# Patient Record
Sex: FEMALE | Race: WHITE | NOT HISPANIC OR LATINO | URBAN - METROPOLITAN AREA
[De-identification: names, ages, dates, MRNs, and addresses within clinical notes are randomized per-mention and may not be internally consistent; named-entity substitution may affect disease eponyms.]

---

## 2018-06-20 NOTE — PATIENT DISCUSSION
- Recommend contact lens holiday for 1-2 weeks (OK to wear a new CTL in the left eye instead if desired)

## 2018-06-20 NOTE — PATIENT DISCUSSION
New Prescription: fluorometholone (fluorometholone): drops,suspension: 0.1% 1 drop twice a day as directed into right eye 06-

## 2020-01-14 NOTE — PATIENT DISCUSSION
New Prescription: Maxitrol (neomycin-polymyxin-dexameth): drops,suspension: 3.5-10,000-0.1 mg/mL-unit/mL-% 1 drop three times a day as directed into right eye 01-

## 2020-01-14 NOTE — PATIENT DISCUSSION
- Follow up in 2-3 months for [facility], or sooner PRN if symptoms don't resolve

## 2020-03-09 NOTE — PATIENT DISCUSSION
DUE TO PATIENT PRESENTING WITH POSSIBLE HZV SYMPTOMS DR. Meghana Barnett IS UNABLE TO BE EXPOSED DUE TO PREGNANCY. NO OTHER PROVIDER AVAILABLE IN [facility] TO SEE PATIENT. OFFERED PATIENT TO SEE [facility] IN [place] AS ORIGINALLY SCHEDULED AND SHE DECLINED. DISCUSSED IMPORTANCE OF SEEKING CARE TODAY DUE TO POSSIBLE HZV. PT. UNDERSTANDS AND STATES SHE WILL REPORT TO EMERGENCY ROOM.   Susan Heller

## 2020-03-24 NOTE — PATIENT DISCUSSION
- Has moderate dry eye today with irregular epithelium that could represent a recent abrasion/erosion.

## 2020-03-24 NOTE — PATIENT DISCUSSION
- Had some concern for shingles two weeks ago. Went to [facility] ED and was referred to [facility]- [address] [place] [address] ED, where she saw ?two ophthalmology residents. Unclear diagnosis per patient. Symptoms have improved since then, but still uncomfortable OS.

## 2021-02-19 NOTE — PATIENT DISCUSSION
- Updated glasses and monovision CTL rx given today.  Patient is happy with uncorrected distance vision OS (20/70), but advised that she must wear glasses while driving

## 2021-02-19 NOTE — PATIENT DISCUSSION
- Follow up in 1 year for [facility], [facility]

## 2021-10-25 ENCOUNTER — NEW PATIENT PROBLEM FOCUSED (OUTPATIENT)
Dept: URBAN - METROPOLITAN AREA CLINIC 52 | Facility: CLINIC | Age: 76
End: 2021-10-25

## 2021-10-25 DIAGNOSIS — H33.322: ICD-10-CM

## 2021-10-25 PROCEDURE — 92134 CPTRZ OPH DX IMG PST SGM RTA: CPT

## 2021-10-25 PROCEDURE — 92004 COMPRE OPH EXAM NEW PT 1/>: CPT

## 2021-10-25 ASSESSMENT — VISUAL ACUITY
OS_PH: 20/30
OU_SC: J7 @ 14IN
OD_SC: 20/25-2
OU_SC: 20/25-1
OS_SC: 20/50-1

## 2021-10-25 ASSESSMENT — TONOMETRY
OD_IOP_MMHG: 14
OS_IOP_MMHG: 14

## 2021-10-25 NOTE — PATIENT DISCUSSION
Discussed with patient,  recommend patient be seen asap by Sybil Dominguez in the [place] office with Dr Mary Novak. Patient instructed to go now.

## 2023-08-14 ENCOUNTER — COMPREHENSIVE EXAM (OUTPATIENT)
Dept: URBAN - METROPOLITAN AREA CLINIC 53 | Facility: CLINIC | Age: 78
End: 2023-08-14

## 2023-08-14 DIAGNOSIS — H25.813: ICD-10-CM

## 2023-08-14 DIAGNOSIS — H35.372: ICD-10-CM

## 2023-08-14 DIAGNOSIS — H52.12: ICD-10-CM

## 2023-08-14 PROCEDURE — 92134 CPTRZ OPH DX IMG PST SGM RTA: CPT

## 2023-08-14 PROCEDURE — 92014 COMPRE OPH EXAM EST PT 1/>: CPT

## 2023-08-14 PROCEDURE — 92015 DETERMINE REFRACTIVE STATE: CPT

## 2023-08-14 ASSESSMENT — TONOMETRY
OD_IOP_MMHG: 20
OS_IOP_MMHG: 22
OD_IOP_MMHG: 21
OS_IOP_MMHG: 23

## 2023-08-14 ASSESSMENT — VISUAL ACUITY
OD_GLARE: 20/20
OS_PH: 20/30-2
OD_GLARE: 20/20
OS_GLARE: 20/50
OS_GLARE: 20/50
OD_SC: 20/25
OU_CC: J1+@16"
OS_SC: 20/40-1

## 2023-08-16 ENCOUNTER — EMERGENCY VISIT (OUTPATIENT)
Dept: URBAN - METROPOLITAN AREA CLINIC 53 | Facility: CLINIC | Age: 78
End: 2023-08-16

## 2023-08-16 DIAGNOSIS — H00.024: ICD-10-CM

## 2023-08-16 PROCEDURE — 92012 INTRM OPH EXAM EST PATIENT: CPT

## 2023-08-16 RX ORDER — ERYTHROMYCIN 5 MG/G: OINTMENT OPHTHALMIC

## 2023-08-16 RX ORDER — DOXYCYCLINE HYCLATE 100 MG/1: 1 TABLET ORAL TWICE A DAY

## 2023-08-16 ASSESSMENT — VISUAL ACUITY
OD_SC: 20/25
OS_SC: 20/40

## 2023-08-29 ENCOUNTER — FOLLOW UP (OUTPATIENT)
Dept: URBAN - METROPOLITAN AREA CLINIC 53 | Facility: CLINIC | Age: 78
End: 2023-08-29

## 2023-08-29 DIAGNOSIS — H00.14: ICD-10-CM

## 2023-08-29 PROCEDURE — 92012 INTRM OPH EXAM EST PATIENT: CPT

## 2023-08-29 ASSESSMENT — VISUAL ACUITY
OU_SC: 20/25-1
OD_SC: 20/25-1
OS_SC: 20/60-2
OS_PH: 20/50

## 2023-08-29 ASSESSMENT — TONOMETRY
OD_IOP_MMHG: 20
OS_IOP_MMHG: 20

## 2023-09-11 ENCOUNTER — PRE-OP/H&P (OUTPATIENT)
Dept: URBAN - METROPOLITAN AREA CLINIC 53 | Facility: CLINIC | Age: 78
End: 2023-09-11

## 2023-09-11 DIAGNOSIS — H25.813: ICD-10-CM

## 2023-09-11 PROCEDURE — 92025CV CORNEAL TOPOGRAPHY, CV

## 2023-09-11 PROCEDURE — PREOP PRE OP VISIT

## 2023-09-11 PROCEDURE — 92136 OPHTHALMIC BIOMETRY: CPT

## 2023-09-11 ASSESSMENT — KERATOMETRY
OD_AXISANGLE_DEGREES: 092
OD_K1POWER_DIOPTERS: 44.25
OS_K1POWER_DIOPTERS: 44.25
OS_AXISANGLE2_DEGREES: 178
OD_K2POWER_DIOPTERS: 43.37
OS_AXISANGLE_DEGREES: 088
OD_AXISANGLE2_DEGREES: 2
OS_K2POWER_DIOPTERS: 43.12

## 2023-09-11 ASSESSMENT — VISUAL ACUITY
OS_PH: 20/60
OD_SC: 20/25+2
OS_SC: 20/70

## 2023-09-11 ASSESSMENT — TONOMETRY
OS_IOP_MMHG: 20
OD_IOP_MMHG: 20

## 2023-09-26 ASSESSMENT — KERATOMETRY
OD_K2POWER_DIOPTERS: 43.37
OD_AXISANGLE_DEGREES: 092
OD_K1POWER_DIOPTERS: 44.25
OS_AXISANGLE_DEGREES: 088
OS_K1POWER_DIOPTERS: 44.25
OS_AXISANGLE2_DEGREES: 178
OS_K2POWER_DIOPTERS: 43.12
OD_AXISANGLE2_DEGREES: 2

## 2023-09-27 ENCOUNTER — SURGERY/PROCEDURE (OUTPATIENT)
Dept: URBAN - METROPOLITAN AREA SURGERY 16 | Facility: SURGERY | Age: 78
End: 2023-09-27

## 2023-09-27 ENCOUNTER — POST-OP (OUTPATIENT)
Dept: URBAN - METROPOLITAN AREA CLINIC 53 | Facility: CLINIC | Age: 78
End: 2023-09-27

## 2023-09-27 DIAGNOSIS — Z98.42: ICD-10-CM

## 2023-09-27 DIAGNOSIS — Z96.1: ICD-10-CM

## 2023-09-27 DIAGNOSIS — H25.812: ICD-10-CM

## 2023-09-27 PROCEDURE — 66984CV REMOVE CATARACT, INSERT LENS, CUSTOM VISION

## 2023-09-27 PROCEDURE — 99199PCV CUSTOM VISION

## 2023-09-27 ASSESSMENT — KERATOMETRY
OS_AXISANGLE_DEGREES: 088
OD_K1POWER_DIOPTERS: 44.25
OS_AXISANGLE2_DEGREES: 178
OS_K2POWER_DIOPTERS: 43.12
OD_K2POWER_DIOPTERS: 43.37
OD_AXISANGLE2_DEGREES: 2
OS_K1POWER_DIOPTERS: 44.25
OD_AXISANGLE_DEGREES: 092

## 2023-09-27 ASSESSMENT — VISUAL ACUITY: OS_SC: 20/80

## 2023-09-27 ASSESSMENT — TONOMETRY: OS_IOP_MMHG: 25

## 2023-10-02 ENCOUNTER — POST-OP (OUTPATIENT)
Dept: URBAN - METROPOLITAN AREA CLINIC 53 | Facility: CLINIC | Age: 78
End: 2023-10-02

## 2023-10-02 DIAGNOSIS — Z96.1: ICD-10-CM

## 2023-10-02 DIAGNOSIS — Z98.42: ICD-10-CM

## 2023-10-02 PROCEDURE — 99024 POSTOP FOLLOW-UP VISIT: CPT

## 2023-10-02 RX ORDER — BIMATOPROST 0.1 MG/ML: 1 SOLUTION/ DROPS OPHTHALMIC EVERY EVENING

## 2023-10-02 ASSESSMENT — VISUAL ACUITY
OS_SC: 20/60
OD_SC: 20/20-1

## 2023-10-02 ASSESSMENT — KERATOMETRY
OD_AXISANGLE2_DEGREES: 2
OD_AXISANGLE_DEGREES: 092
OS_K2POWER_DIOPTERS: 43.12
OD_K2POWER_DIOPTERS: 43.37
OS_AXISANGLE_DEGREES: 088
OS_K1POWER_DIOPTERS: 44.25
OS_AXISANGLE2_DEGREES: 178
OD_K1POWER_DIOPTERS: 44.25

## 2023-10-02 ASSESSMENT — TONOMETRY
OS_IOP_MMHG: 39
OD_IOP_MMHG: 17

## 2023-10-23 ENCOUNTER — POST-OP (OUTPATIENT)
Dept: URBAN - METROPOLITAN AREA CLINIC 53 | Facility: CLINIC | Age: 78
End: 2023-10-23

## 2023-10-23 DIAGNOSIS — H35.372: ICD-10-CM

## 2023-10-23 DIAGNOSIS — Z96.1: ICD-10-CM

## 2023-10-23 DIAGNOSIS — Z98.42: ICD-10-CM

## 2023-10-23 DIAGNOSIS — H26.492: ICD-10-CM

## 2023-10-23 PROCEDURE — 92134 CPTRZ OPH DX IMG PST SGM RTA: CPT

## 2023-10-23 PROCEDURE — 99024 POSTOP FOLLOW-UP VISIT: CPT

## 2023-10-23 RX ORDER — BRINZOLAMIDE/BRIMONIDINE TARTRATE 10; 2 MG/ML; MG/ML: 1 SUSPENSION/ DROPS OPHTHALMIC TWICE A DAY

## 2023-10-23 RX ORDER — PREDNISOLONE ACETATE 10 MG/ML: 1 SUSPENSION/ DROPS OPHTHALMIC TWICE A DAY

## 2023-10-23 ASSESSMENT — TONOMETRY
OS_IOP_MMHG: 25
OD_IOP_MMHG: 18

## 2023-10-23 ASSESSMENT — VISUAL ACUITY
OD_SC: 20/25-2
OS_SC: 20/80

## 2023-10-23 ASSESSMENT — KERATOMETRY
OS_AXISANGLE_DEGREES: 088
OS_K2POWER_DIOPTERS: 43.12
OD_K1POWER_DIOPTERS: 44.25
OD_AXISANGLE2_DEGREES: 2
OD_AXISANGLE_DEGREES: 092
OD_K2POWER_DIOPTERS: 43.37
OS_AXISANGLE2_DEGREES: 178
OS_K1POWER_DIOPTERS: 44.25

## 2023-11-27 ENCOUNTER — FOLLOW UP (OUTPATIENT)
Dept: URBAN - METROPOLITAN AREA CLINIC 53 | Facility: CLINIC | Age: 78
End: 2023-11-27

## 2023-11-27 DIAGNOSIS — H35.372: ICD-10-CM

## 2023-11-27 DIAGNOSIS — Z96.1: ICD-10-CM

## 2023-11-27 PROCEDURE — 92134 CPTRZ OPH DX IMG PST SGM RTA: CPT

## 2023-11-27 PROCEDURE — 92012 INTRM OPH EXAM EST PATIENT: CPT

## 2023-11-27 ASSESSMENT — KERATOMETRY
OS_AXISANGLE_DEGREES: 088
OD_K2POWER_DIOPTERS: 43.37
OD_K1POWER_DIOPTERS: 44.25
OS_K1POWER_DIOPTERS: 44.25
OD_AXISANGLE_DEGREES: 092
OS_AXISANGLE2_DEGREES: 178
OS_K2POWER_DIOPTERS: 43.12
OD_AXISANGLE2_DEGREES: 2

## 2023-11-27 ASSESSMENT — TONOMETRY
OD_IOP_MMHG: 18
OS_IOP_MMHG: 33
OS_IOP_MMHG: 32

## 2023-11-27 ASSESSMENT — VISUAL ACUITY
OD_SC: 20/25+2
OS_SC: 20/80-2
OS_PH: 20/50-1

## 2023-12-18 ENCOUNTER — ESTABLISHED PATIENT (OUTPATIENT)
Dept: URBAN - METROPOLITAN AREA CLINIC 53 | Facility: CLINIC | Age: 78
End: 2023-12-18

## 2023-12-18 DIAGNOSIS — H26.492: ICD-10-CM

## 2023-12-18 PROCEDURE — 92014 COMPRE OPH EXAM EST PT 1/>: CPT

## 2023-12-18 PROCEDURE — 66821 AFTER CATARACT LASER SURGERY: CPT

## 2023-12-18 ASSESSMENT — VISUAL ACUITY
OS_SC: 20/60-2
OU_SC: 20/30
OD_SC: 20/25+2

## 2023-12-18 ASSESSMENT — KERATOMETRY
OD_AXISANGLE2_DEGREES: 2
OS_AXISANGLE_DEGREES: 088
OS_AXISANGLE2_DEGREES: 178
OS_K2POWER_DIOPTERS: 43.12
OD_K2POWER_DIOPTERS: 43.37
OS_K1POWER_DIOPTERS: 44.25
OD_AXISANGLE_DEGREES: 092
OD_K1POWER_DIOPTERS: 44.25

## 2023-12-18 ASSESSMENT — TONOMETRY
OS_IOP_MMHG: 20
OD_IOP_MMHG: 17

## 2024-01-15 ENCOUNTER — POST-OP (OUTPATIENT)
Dept: URBAN - METROPOLITAN AREA CLINIC 53 | Facility: CLINIC | Age: 79
End: 2024-01-15

## 2024-01-15 DIAGNOSIS — H00.14: ICD-10-CM

## 2024-01-15 DIAGNOSIS — H25.811: ICD-10-CM

## 2024-01-15 DIAGNOSIS — H43.813: ICD-10-CM

## 2024-01-15 DIAGNOSIS — Z98.890: ICD-10-CM

## 2024-01-15 PROCEDURE — 92134 CPTRZ OPH DX IMG PST SGM RTA: CPT

## 2024-01-15 PROCEDURE — 99024 POSTOP FOLLOW-UP VISIT: CPT

## 2024-01-15 ASSESSMENT — KERATOMETRY
OD_AXISANGLE_DEGREES: 092
OS_AXISANGLE_DEGREES: 088
OD_K2POWER_DIOPTERS: 43.37
OS_K1POWER_DIOPTERS: 44.25
OS_K2POWER_DIOPTERS: 43.12
OD_AXISANGLE2_DEGREES: 2
OD_K1POWER_DIOPTERS: 44.25
OS_AXISANGLE2_DEGREES: 178

## 2024-01-15 ASSESSMENT — TONOMETRY
OS_IOP_MMHG: 17
OD_IOP_MMHG: 17

## 2024-01-15 ASSESSMENT — VISUAL ACUITY
OD_SC: 20/20-2
OS_SC: 20/40-2

## 2024-01-22 ENCOUNTER — ESTABLISHED PATIENT (OUTPATIENT)
Dept: URBAN - METROPOLITAN AREA CLINIC 53 | Facility: CLINIC | Age: 79
End: 2024-01-22

## 2024-01-22 DIAGNOSIS — H00.024: ICD-10-CM

## 2024-01-22 DIAGNOSIS — H25.811: ICD-10-CM

## 2024-01-22 PROCEDURE — 99213 OFFICE O/P EST LOW 20 MIN: CPT

## 2024-01-22 RX ORDER — ERYTHROMYCIN 5 MG/G
OINTMENT OPHTHALMIC
Start: 2024-01-22 | End: 2024-01-29

## 2024-01-22 ASSESSMENT — VISUAL ACUITY
OS_SC: 20/40-2
OD_SC: 20/20

## 2024-01-22 ASSESSMENT — TONOMETRY
OS_IOP_MMHG: 16
OD_IOP_MMHG: 16

## 2024-01-22 ASSESSMENT — KERATOMETRY
OS_AXISANGLE2_DEGREES: 178
OD_K2POWER_DIOPTERS: 43.37
OD_AXISANGLE2_DEGREES: 2
OD_K1POWER_DIOPTERS: 44.25
OS_K1POWER_DIOPTERS: 44.25
OS_K2POWER_DIOPTERS: 43.12
OS_AXISANGLE_DEGREES: 088
OD_AXISANGLE_DEGREES: 092

## 2024-04-15 ENCOUNTER — FOLLOW UP (OUTPATIENT)
Dept: URBAN - METROPOLITAN AREA CLINIC 53 | Facility: CLINIC | Age: 79
End: 2024-04-15

## 2024-04-15 DIAGNOSIS — H30.22: ICD-10-CM

## 2024-04-15 DIAGNOSIS — H35.352: ICD-10-CM

## 2024-04-15 DIAGNOSIS — H25.811: ICD-10-CM

## 2024-04-15 PROCEDURE — 99213 OFFICE O/P EST LOW 20 MIN: CPT

## 2024-04-15 ASSESSMENT — KERATOMETRY
OD_K1POWER_DIOPTERS: 44.25
OS_K2POWER_DIOPTERS: 43.12
OS_AXISANGLE_DEGREES: 088
OD_K2POWER_DIOPTERS: 43.37
OS_K1POWER_DIOPTERS: 44.25
OD_AXISANGLE2_DEGREES: 2
OS_AXISANGLE2_DEGREES: 178
OD_AXISANGLE_DEGREES: 092

## 2024-04-15 ASSESSMENT — TONOMETRY
OD_IOP_MMHG: 14
OS_IOP_MMHG: 15

## 2024-04-15 ASSESSMENT — VISUAL ACUITY
OS_SC: 20/40-1
OD_SC: 20/20
OU_SC: 20/20

## 2024-08-16 ENCOUNTER — FOLLOW UP (OUTPATIENT)
Dept: URBAN - METROPOLITAN AREA CLINIC 49 | Facility: LOCATION | Age: 79
End: 2024-08-16

## 2024-08-16 DIAGNOSIS — H30.22: ICD-10-CM

## 2024-08-16 PROCEDURE — 99213 OFFICE O/P EST LOW 20 MIN: CPT

## 2024-08-16 ASSESSMENT — VISUAL ACUITY
OD_SC: 20/25-2
OS_PH: 20/40
OS_SC: 20/60+2

## 2024-08-16 ASSESSMENT — TONOMETRY
OD_IOP_MMHG: 15
OS_IOP_MMHG: 17

## 2024-11-18 ENCOUNTER — FOLLOW UP (OUTPATIENT)
Dept: URBAN - METROPOLITAN AREA CLINIC 53 | Facility: CLINIC | Age: 79
End: 2024-11-18

## 2024-11-18 DIAGNOSIS — H30.22: ICD-10-CM

## 2024-11-18 DIAGNOSIS — H35.352: ICD-10-CM

## 2024-11-18 PROCEDURE — 99213 OFFICE O/P EST LOW 20 MIN: CPT

## 2025-06-02 ENCOUNTER — COMPREHENSIVE EXAM (OUTPATIENT)
Age: 80
End: 2025-06-02

## 2025-06-02 DIAGNOSIS — H43.813: ICD-10-CM

## 2025-06-02 DIAGNOSIS — H25.811: ICD-10-CM

## 2025-06-02 DIAGNOSIS — H52.4: ICD-10-CM

## 2025-06-02 DIAGNOSIS — H35.372: ICD-10-CM

## 2025-06-02 DIAGNOSIS — H35.352: ICD-10-CM

## 2025-06-02 DIAGNOSIS — Z01.00: ICD-10-CM

## 2025-06-02 PROCEDURE — 92015 DETERMINE REFRACTIVE STATE: CPT

## 2025-06-02 PROCEDURE — 92014 COMPRE OPH EXAM EST PT 1/>: CPT
